# Patient Record
(demographics unavailable — no encounter records)

---

## 2025-07-06 NOTE — ASSESSMENT
[FreeTextEntry1] : Lifestyle changes for control of BP reviewed ie wgt reduction, salt restriction, Etoh avoidance, exercise 30 min aerobic activity per day, avoid NSAID use self monitor BP TIW Lifestyle advice for LDL reduction including reduction of red meat consumption concentrating on a plant based diet. 30 min aerobic exercise per day. Calorie reduction to target BMI<25

## 2025-07-06 NOTE — REVIEW OF SYSTEMS
[SOB] : shortness of breath [Dyspnea on exertion] : dyspnea during exertion [Lower Ext Edema] : lower extremity edema [Rash] : rash [Skin Lesions] : skin lesion(s): [Convulsions] : convulsions [Anxiety] : anxiety [Under Stress] : under stress [Easy Bleeding] : a tendency for easy bleeding [Easy Bruising] : a tendency for easy bruising [Negative] : Genitourinary [Fever] : no fever [Chills] : no chills [Chest Discomfort] : no chest discomfort [Leg Claudication] : no intermittent leg claudication [PND] : no PND [Syncope] : no syncope [Itching] : no itching [Telangiectasias] : no telangiectasias [Limb Weakness (Paresis)] : no limb weakness (Paresis) [Confusion] : no confusion was observed [Swollen Glands] : no swollen glands

## 2025-07-06 NOTE — HISTORY OF PRESENT ILLNESS
OPERATIVE REPORT   PATIENT NAME: Juliet Centeno  MRN: [id number]  DATE OF OPERATION: 9/17/2019  PREOPERATIVE DIAGNOSIS: severe iron deficiency anemia on coumadin; patient discharged off coumadin and stopping iron supplements  POSTOPERATIVE DIAGNOSES   1.  Claribel Pollack pylori. There were no immediate complications. COLONOSCOPY PROCEDURE NOTE:   The procedure was completed without difficulty. The patient tolerated the procedure well. The prep was good.  The colonoscope was inserted through the anus and advanced to the le washed off and suctioned. This may require earlier return for colonoscopy within 3-7 yrs depending on the colonoscopy findings and family history.   Fair: Colon with thick layer of stool or particulate matter that impaired proper visualization of the mucosa [FreeTextEntry1] : 7/3/25 SUSAN LUZ is 72 year she with prior h/o   ( ) HTN  ( ) AODM  ( ) smoker  ( ) lipids ( ) obesity ( ) fam hx of CAD   [facility] - Cardiology Consultation for Edema and Shortness of Breath Today at 10:42 am SOAP Note arrow Subjective: - Summary : 72-year-old female with a history of skin cancer, seizure disorder, anxiety, panic disorder, and recent lower extremity edema presenting for cardiology evaluation as referred by her primary care physician. - Chief Complaint (CC) : Lower extremity edema and occasional shortness of breath - History of Present Illness (HPI) : Remy Luz, a 72-year-old female, presents for cardiology evaluation as referred by her primary care physician, Dr. Carrizales. The patient reports persistent lower extremity edema despite taking diuretics for about 10 days. She also experiences occasional shortness of breath. The edema does not seem to improve significantly after lying down at night. The patient denies any known history of heart attacks but mentions noticing her mouth appearing slightly crooked, which she attributes to aging. - Past Medical History : Skin cancer (type unspecified, recent cryotherapy), Seizure disorder, Anxiety disorder, Panic disorder - Past Surgical History : Full knee replacement (side not specified), Left hip surgery (two procedures), Mohs surgery on scalp - Family History : Not provided - Social History : Never smoker, Full-blooded Taoist,  to a Yazidism  - Review of Systems : Cardiovascular Denies known history of heart attacks, reports lower extremity edema and occasional shortness of breath. Neurological Reports seizure disorder with last episode a few weeks ago, severe anxiety, and panic disorders especially in elevators. Dermatological Recent cryotherapy for a skin lesion. Musculoskeletal Reports pain in legs. - Medications : Levetiracetam (for seizures), Alprazolam (for anxiety), Diuretic (unspecified, for edema), Topical creams (for skin) - Allergies : Not provided Objective: - Diagnostic Results : No diagnostic results provided in the conversation - Vital Signs : Not provided in the conversation - Physical Examination (PE) : General Patient appears to have lower extremity edema. HEENT Patient mentions noticing her mouth appearing slightly crooked. Cardiovascular To be assessed. Respiratory To be assessed for reported shortness of breath. Skin Recent cryotherapy site noted. Assessment: - Summary : 72-year-old female with multiple chronic conditions including seizure disorder, anxiety, and skin cancer, presenting with persistent lower extremity edema and occasional shortness of breath. The patient's symptoms warrant further cardiac evaluation. - Problems : - Lower extremity edema, unresponsive to current diuretic therapy  - Occasional shortness of breath  - Seizure disorder  - Anxiety and panic disorder  - History of skin cancer  - Differential Diagnosis : - Congestive Heart Failure  - Venous insufficiency  - Medication-induced edema  - Renal dysfunction  - Lymphedema  Plan: - Summary : Comprehensive cardiac evaluation to determine the cause of edema and shortness of breath. Adjust current medications as necessary and consider additional diagnostic tests. - Plan : - Perform comprehensive cardiac examination  - Order echocardiogram to assess cardiac function  - Consider chest X-ray to evaluate for pulmonary congestion  - Review and potentially adjust current diuretic therapy  - Assess renal function with blood tests  - Evaluate current antiepileptic medication regimen  - Consider referral to psychiatry for management of anxiety and panic disorder  - Follow up with dermatology for ongoing skin cancer surveillance  - Educate patient on signs and symptoms that should prompt immediate medical attention  - Schedule follow-up appointment to review test results and adjust treatment plan as necessar

## 2025-07-06 NOTE — HISTORY OF PRESENT ILLNESS
[FreeTextEntry1] : 7/3/25 SUSAN LUZ is 72 year she with prior h/o   ( ) HTN  ( ) AODM  ( ) smoker  ( ) lipids ( ) obesity ( ) fam hx of CAD   [facility] - Cardiology Consultation for Edema and Shortness of Breath Today at 10:42 am SOAP Note arrow Subjective: - Summary : 72-year-old female with a history of skin cancer, seizure disorder, anxiety, panic disorder, and recent lower extremity edema presenting for cardiology evaluation as referred by her primary care physician. - Chief Complaint (CC) : Lower extremity edema and occasional shortness of breath - History of Present Illness (HPI) : Remy Luz, a 72-year-old female, presents for cardiology evaluation as referred by her primary care physician, Dr. Carrizales. The patient reports persistent lower extremity edema despite taking diuretics for about 10 days. She also experiences occasional shortness of breath. The edema does not seem to improve significantly after lying down at night. The patient denies any known history of heart attacks but mentions noticing her mouth appearing slightly crooked, which she attributes to aging. - Past Medical History : Skin cancer (type unspecified, recent cryotherapy), Seizure disorder, Anxiety disorder, Panic disorder - Past Surgical History : Full knee replacement (side not specified), Left hip surgery (two procedures), Mohs surgery on scalp - Family History : Not provided - Social History : Never smoker, Full-blooded Mandaeism,  to a Mormonism  - Review of Systems : Cardiovascular Denies known history of heart attacks, reports lower extremity edema and occasional shortness of breath. Neurological Reports seizure disorder with last episode a few weeks ago, severe anxiety, and panic disorders especially in elevators. Dermatological Recent cryotherapy for a skin lesion. Musculoskeletal Reports pain in legs. - Medications : Levetiracetam (for seizures), Alprazolam (for anxiety), Diuretic (unspecified, for edema), Topical creams (for skin) - Allergies : Not provided Objective: - Diagnostic Results : No diagnostic results provided in the conversation - Vital Signs : Not provided in the conversation - Physical Examination (PE) : General Patient appears to have lower extremity edema. HEENT Patient mentions noticing her mouth appearing slightly crooked. Cardiovascular To be assessed. Respiratory To be assessed for reported shortness of breath. Skin Recent cryotherapy site noted. Assessment: - Summary : 72-year-old female with multiple chronic conditions including seizure disorder, anxiety, and skin cancer, presenting with persistent lower extremity edema and occasional shortness of breath. The patient's symptoms warrant further cardiac evaluation. - Problems : - Lower extremity edema, unresponsive to current diuretic therapy  - Occasional shortness of breath  - Seizure disorder  - Anxiety and panic disorder  - History of skin cancer  - Differential Diagnosis : - Congestive Heart Failure  - Venous insufficiency  - Medication-induced edema  - Renal dysfunction  - Lymphedema  Plan: - Summary : Comprehensive cardiac evaluation to determine the cause of edema and shortness of breath. Adjust current medications as necessary and consider additional diagnostic tests. - Plan : - Perform comprehensive cardiac examination  - Order echocardiogram to assess cardiac function  - Consider chest X-ray to evaluate for pulmonary congestion  - Review and potentially adjust current diuretic therapy  - Assess renal function with blood tests  - Evaluate current antiepileptic medication regimen  - Consider referral to psychiatry for management of anxiety and panic disorder  - Follow up with dermatology for ongoing skin cancer surveillance  - Educate patient on signs and symptoms that should prompt immediate medical attention  - Schedule follow-up appointment to review test results and adjust treatment plan as necessar

## 2025-07-06 NOTE — PHYSICAL EXAM
[Frail] : frail [Normal Venous Pressure] : normal venous pressure [No Precordial Heave] : no precordial heave was noted [Rhythm Regular] : regular [Normal S1] : normal S1 [Normal S2] : normal S2 [II] : a grade 2 [___ +] : bilateral [unfilled]U+ pitting edema to the ankles [Rt] : varicose veins of the right leg noted [Lt] : varicose veins of the left leg noted [Normal] : normal gait [Apical Thrill] : no thrill palpable at the apex [Click] : no click [Distant] : the heart sounds were ~L not distant [de-identified] : non icteriic  [de-identified] : venous stasisi

## 2025-07-06 NOTE — ADDENDUM
[FreeTextEntry1] : Forrest Puri assisted in documentation on Jul 3 2025 acting as a scribe for Dr. Gino Tapia.

## 2025-07-06 NOTE — PHYSICAL EXAM
[Frail] : frail [Normal Venous Pressure] : normal venous pressure [No Precordial Heave] : no precordial heave was noted [Rhythm Regular] : regular [Normal S1] : normal S1 [Normal S2] : normal S2 [II] : a grade 2 [___ +] : bilateral [unfilled]U+ pitting edema to the ankles [Rt] : varicose veins of the right leg noted [Lt] : varicose veins of the left leg noted [Normal] : normal gait [Apical Thrill] : no thrill palpable at the apex [Click] : no click [Distant] : the heart sounds were ~L not distant [de-identified] : non icteriic  [de-identified] : venous stasisi

## 2025-07-06 NOTE — DISCUSSION/SUMMARY
[EKG obtained to assist in diagnosis and management of assessed problem(s)] : EKG obtained to assist in diagnosis and management of assessed problem(s) [FreeTextEntry1] : Lvef 55 MILD TR MR BNP<pending  venous compression stocking